# Patient Record
Sex: FEMALE | Race: OTHER | NOT HISPANIC OR LATINO
[De-identification: names, ages, dates, MRNs, and addresses within clinical notes are randomized per-mention and may not be internally consistent; named-entity substitution may affect disease eponyms.]

---

## 2018-07-30 PROBLEM — Z00.00 ENCOUNTER FOR PREVENTIVE HEALTH EXAMINATION: Status: ACTIVE | Noted: 2018-07-30

## 2018-08-16 ENCOUNTER — APPOINTMENT (OUTPATIENT)
Dept: PLASTIC SURGERY | Facility: CLINIC | Age: 58
End: 2018-08-16
Payer: SELF-PAY

## 2018-08-16 VITALS
HEART RATE: 71 BPM | SYSTOLIC BLOOD PRESSURE: 121 MMHG | OXYGEN SATURATION: 97 % | TEMPERATURE: 98.1 F | HEIGHT: 66 IN | WEIGHT: 200 LBS | BODY MASS INDEX: 32.14 KG/M2 | DIASTOLIC BLOOD PRESSURE: 67 MMHG | RESPIRATION RATE: 20 BRPM

## 2018-08-16 VITALS
DIASTOLIC BLOOD PRESSURE: 74 MMHG | HEIGHT: 65 IN | TEMPERATURE: 99.4 F | SYSTOLIC BLOOD PRESSURE: 120 MMHG | OXYGEN SATURATION: 98 % | BODY MASS INDEX: 21.99 KG/M2 | WEIGHT: 132 LBS | RESPIRATION RATE: 20 BRPM | HEART RATE: 83 BPM

## 2018-08-16 DIAGNOSIS — Z87.891 PERSONAL HISTORY OF NICOTINE DEPENDENCE: ICD-10-CM

## 2018-08-16 PROCEDURE — 99201 OFFICE OUTPATIENT NEW 10 MINUTES: CPT | Mod: NC

## 2018-10-08 ENCOUNTER — APPOINTMENT (OUTPATIENT)
Dept: PLASTIC SURGERY | Facility: HOSPITAL | Age: 58
End: 2018-10-08
Payer: SELF-PAY

## 2018-10-08 PROCEDURE — 15836 EXC EXCESSIVE SKIN ARM: CPT

## 2018-10-08 PROCEDURE — 20926: CPT

## 2018-10-08 PROCEDURE — 15825 RHYTDCT NCK PLTYSML TGHTG: CPT

## 2018-10-12 ENCOUNTER — APPOINTMENT (OUTPATIENT)
Dept: PLASTIC SURGERY | Facility: CLINIC | Age: 58
End: 2018-10-12
Payer: SELF-PAY

## 2018-10-12 PROCEDURE — 99024 POSTOP FOLLOW-UP VISIT: CPT

## 2018-10-15 ENCOUNTER — APPOINTMENT (OUTPATIENT)
Dept: PLASTIC SURGERY | Facility: CLINIC | Age: 58
End: 2018-10-15
Payer: SELF-PAY

## 2018-10-15 PROCEDURE — 99024 POSTOP FOLLOW-UP VISIT: CPT

## 2018-10-18 ENCOUNTER — APPOINTMENT (OUTPATIENT)
Dept: PLASTIC SURGERY | Facility: CLINIC | Age: 58
End: 2018-10-18
Payer: SELF-PAY

## 2018-10-18 PROCEDURE — 99024 POSTOP FOLLOW-UP VISIT: CPT

## 2018-10-30 ENCOUNTER — APPOINTMENT (OUTPATIENT)
Dept: PLASTIC SURGERY | Facility: CLINIC | Age: 58
End: 2018-10-30
Payer: SELF-PAY

## 2018-10-30 PROCEDURE — 99024 POSTOP FOLLOW-UP VISIT: CPT

## 2018-12-20 ENCOUNTER — RESULT REVIEW (OUTPATIENT)
Age: 58
End: 2018-12-20

## 2019-02-19 ENCOUNTER — APPOINTMENT (OUTPATIENT)
Dept: PLASTIC SURGERY | Facility: CLINIC | Age: 59
End: 2019-02-19
Payer: SELF-PAY

## 2019-02-19 PROCEDURE — 99212 OFFICE O/P EST SF 10 MIN: CPT | Mod: NC

## 2019-02-19 NOTE — HISTORY OF PRESENT ILLNESS
[FreeTextEntry1] : pt is 4 mos s/p facelift and has several complaints  she had a fecelift with fat grafts more than 20 years ango and is complianing that since she has lost weight the fat grafts are more prominent in the l motre than r lower nlf.  she desires to have them repaired.  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  disc massage and possibly botox to soiftern them and she is instructed as to how to perform the stretch massage .  she will rto for office based procedure to remove some fat from the nlf bilat  otherwise her facelift looks well done

## 2019-03-12 RX ORDER — CEPHALEXIN 500 MG/1
500 CAPSULE ORAL 4 TIMES DAILY
Qty: 20 | Refills: 0 | Status: ACTIVE | COMMUNITY
Start: 2019-03-12

## 2019-03-12 RX ORDER — HYDROCHLOROTHIAZIDE 50 MG/1
50 TABLET ORAL
Refills: 0 | Status: ACTIVE | COMMUNITY
Start: 2019-03-12

## 2019-03-14 ENCOUNTER — APPOINTMENT (OUTPATIENT)
Dept: PLASTIC SURGERY | Facility: CLINIC | Age: 59
End: 2019-03-14
Payer: SELF-PAY

## 2019-03-14 VITALS
SYSTOLIC BLOOD PRESSURE: 111 MMHG | OXYGEN SATURATION: 100 % | HEART RATE: 90 BPM | TEMPERATURE: 99 F | DIASTOLIC BLOOD PRESSURE: 75 MMHG | RESPIRATION RATE: 20 BRPM

## 2019-03-14 DIAGNOSIS — L98.8 OTHER SPECIFIED DISORDERS OF THE SKIN AND SUBCUTANEOUS TISSUE: ICD-10-CM

## 2019-03-14 PROCEDURE — 99211 OFF/OP EST MAY X REQ PHY/QHP: CPT | Mod: NC

## 2019-03-14 NOTE — PROCEDURE
[FreeTextEntry1] : nlf lipodystrophy [FreeTextEntry2] : sal nlf bilat  [FreeTextEntry6] : pt c/o bilateral upper lip nlf overfill with fat and desires aspiration for volume reduction. rbal/s outlined  disc probability of capsule around fat which may make removal refractory.  limitations stressed pt understands and wishes to proceed.  under aseptic conditions and local anesthetic 2 cc lido 1:100 k epi  20 g blunt needle used for aspiration .3 cc fat per side .  improved contour noted  compression applied with benzoin and steri strips  .  instructions rev'wed rto 10 d

## 2019-03-14 NOTE — ASSESSMENT
[FreeTextEntry1] : micr aspiration nlf bilateral under local anesthetic  rbal/s outlined limitations stressed  pretreated c keflex

## 2019-04-18 ENCOUNTER — APPOINTMENT (OUTPATIENT)
Dept: PLASTIC SURGERY | Facility: CLINIC | Age: 59
End: 2019-04-18

## 2019-06-06 ENCOUNTER — APPOINTMENT (OUTPATIENT)
Dept: PLASTIC SURGERY | Facility: CLINIC | Age: 59
End: 2019-06-06
Payer: SELF-PAY

## 2019-06-06 DIAGNOSIS — L57.4 CUTIS LAXA SENILIS: ICD-10-CM

## 2019-06-06 PROCEDURE — 99211 OFF/OP EST MAY X REQ PHY/QHP: CPT | Mod: NC

## 2019-06-07 PROBLEM — L57.4 CUTIS LAXA SENILIS: Status: ACTIVE | Noted: 2018-08-16

## 2019-06-07 NOTE — ASSESSMENT
[FreeTextEntry1] : good facelift result  pt is mostly happy with few complaints not interested in any further surgery

## 2019-06-07 NOTE — HISTORY OF PRESENT ILLNESS
[FreeTextEntry1] : pt has nice post facelift and fat grafting result  had aspiration of nlf fat with improvement r but same on l. pt c/o platysma residual bands which are under sl tension disc massage rto prn